# Patient Record
Sex: FEMALE | Race: WHITE
[De-identification: names, ages, dates, MRNs, and addresses within clinical notes are randomized per-mention and may not be internally consistent; named-entity substitution may affect disease eponyms.]

---

## 2019-09-22 ENCOUNTER — HOSPITAL ENCOUNTER (INPATIENT)
Dept: HOSPITAL 4 - SED | Age: 29
LOS: 3 days | Discharge: HOME HEALTH | DRG: 721 | End: 2019-09-25
Attending: INTERNAL MEDICINE | Admitting: INTERNAL MEDICINE
Payer: MEDICAID

## 2019-09-22 VITALS — WEIGHT: 170 LBS | BODY MASS INDEX: 32.1 KG/M2 | HEIGHT: 61 IN

## 2019-09-22 VITALS — SYSTOLIC BLOOD PRESSURE: 107 MMHG

## 2019-09-22 VITALS — SYSTOLIC BLOOD PRESSURE: 122 MMHG

## 2019-09-22 VITALS — SYSTOLIC BLOOD PRESSURE: 113 MMHG

## 2019-09-22 VITALS — SYSTOLIC BLOOD PRESSURE: 136 MMHG

## 2019-09-22 VITALS — SYSTOLIC BLOOD PRESSURE: 108 MMHG
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
## 2019-09-22 DIAGNOSIS — I10: ICD-10-CM

## 2019-09-22 DIAGNOSIS — L03.311: ICD-10-CM

## 2019-09-22 DIAGNOSIS — J45.909: ICD-10-CM

## 2019-09-22 DIAGNOSIS — N73.9: ICD-10-CM
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
## 2019-09-22 DIAGNOSIS — T81.41XA: Primary | ICD-10-CM

## 2019-09-22 LAB
ALBUMIN SERPL BCP-MCNC: 2.2 G/DL (ref 3.4–4.8)
ALT SERPL W P-5'-P-CCNC: 34 U/L (ref 12–78)
ANION GAP SERPL CALCULATED.3IONS-SCNC: 9 MMOL/L (ref 5–15)
APPEARANCE UR: (no result)
AST SERPL W P-5'-P-CCNC: 26 U/L (ref 10–37)
BACTERIA URNS QL MICRO: (no result) /HPF
BASOPHILS NFR BLD MANUAL: 0 % (ref 0–2)
BILIRUB SERPL-MCNC: 0.1 MG/DL (ref 0–1)
BILIRUB UR QL STRIP: NEGATIVE
BUN SERPL-MCNC: 8 MG/DL (ref 8–21)
CALCIUM SERPL-MCNC: 8.4 MG/DL (ref 8.4–11)
CHLORIDE SERPL-SCNC: 103 MMOL/L (ref 98–107)
COLOR UR: YELLOW
CREAT SERPL-MCNC: 0.59 MG/DL (ref 0.55–1.3)
EOSINOPHIL NFR BLD MANUAL: 0 % (ref 0–7)
ERYTHROCYTE [DISTWIDTH] IN BLOOD BY AUTOMATED COUNT: 13.5 % (ref 9–15)
GFR SERPL CREATININE-BSD FRML MDRD: 153 ML/MIN (ref 90–?)
GLUCOSE SERPL-MCNC: 99 MG/DL (ref 70–99)
GLUCOSE UR STRIP-MCNC: NEGATIVE MG/DL
HCT VFR BLD AUTO: 35.2 % (ref 36–48)
HGB BLD-MCNC: 11.7 G/DL (ref 12–16)
HGB UR QL STRIP: (no result)
KETONES UR STRIP-MCNC: NEGATIVE MG/DL
LEUKOCYTE ESTERASE UR QL STRIP: NEGATIVE
LYMPHOCYTES NFR BLD MANUAL: 11 % (ref 20–46)
MCH RBC QN AUTO: 31 PG (ref 27–31)
MCHC RBC AUTO-ENTMCNC: 33 % (ref 32–36)
MCV RBC AUTO: 94 FL (ref 79–98)
MONOCYTES # BLD MANUAL: 5 % (ref 0–11)
NEUTS BAND NFR BLD MANUAL: 2 % (ref 0–6)
NITRITE UR QL STRIP: NEGATIVE
PH UR STRIP: 6.5 [PH] (ref 5–8)
PLATELET # BLD AUTO: 301 K/UL (ref 130–430)
POTASSIUM SERPL-SCNC: 3.5 MMOL/L (ref 3.5–5.1)
PROT UR QL STRIP: NEGATIVE
RBC # BLD AUTO: 3.74 MIL/UL (ref 4.2–6.2)
RBC #/AREA URNS HPF: (no result) /HPF (ref 0–3)
SODIUM SERPLBLD-SCNC: 137 MMOL/L (ref 136–145)
SP GR UR STRIP: 1.01 (ref 1–1.03)
UROBILINOGEN UR STRIP-MCNC: 0.2 MG/DL (ref 0.2–1)
VARIANT LYMPHS NFR BLD MANUAL: 0 % (ref 0–0)
WBC # BLD AUTO: 12.8 K/UL (ref 4.8–10.8)
WBC #/AREA URNS HPF: (no result) /HPF (ref 0–3)

## 2019-09-22 RX ADMIN — ACETAMINOPHEN PRN MG: 325 TABLET ORAL at 23:16

## 2019-09-22 RX ADMIN — SODIUM CHLORIDE SCH MLS/HR: 0.9 INJECTION, SOLUTION INTRAVENOUS at 18:18

## 2019-09-22 RX ADMIN — DEXTROSE MONOHYDRATE SCH MLS/HR: 50 INJECTION, SOLUTION INTRAVENOUS at 20:16

## 2019-09-22 RX ADMIN — MORPHINE SULFATE PRN MG: 2 INJECTION, SOLUTION INTRAMUSCULAR; INTRAVENOUS at 15:44

## 2019-09-22 RX ADMIN — DEXTROSE MONOHYDRATE SCH MLS/HR: 50 INJECTION, SOLUTION INTRAVENOUS at 09:34

## 2019-09-22 RX ADMIN — MORPHINE SULFATE PRN MG: 2 INJECTION, SOLUTION INTRAMUSCULAR; INTRAVENOUS at 20:16

## 2019-09-22 RX ADMIN — DEXTROSE MONOHYDRATE SCH MLS/HR: 50 INJECTION, SOLUTION INTRAVENOUS at 15:22

## 2019-09-22 RX ADMIN — SODIUM CHLORIDE SCH MLS/HR: 0.9 INJECTION, SOLUTION INTRAVENOUS at 11:30

## 2019-09-22 NOTE — NUR
CONSULTATION PAGED/CALLED

Reason for Consultation: ABDOMINAL WALL CELLULITIS

Person Who was Notified: SHARAD

Consulting Physician: DR. INGRAM /DR. VASQUEZ ON CALL

Consultant Specialty: ID

Ordering Physician: DR. MCWILLIAMS

## 2019-09-22 NOTE — NUR
ADMISSION NOTE

Received patient from ER via jane, received report from CYNTHIA MALHOTRA. Patient admitted with 
diagnosis of CELLULITIS OF ABDOMINAL WALL. Patient oriented to hospital routine, call light, 
toileting and safety-patient verbalized understanding.

## 2019-09-22 NOTE — NUR
Pain:



Patient is complaining of severe abdominal pain. Educated patient regarding indications and 
side effects of Morphine, understanding verbalized. Morphine 2 MG administered intravenously 
as ordered. No adverse reactions noted. Call light with patient. Safety and fall precautions 
in place. Will continue to monitor.

## 2019-09-22 NOTE — NUR
Received report from MARYA Chu. Patient to be admitted or transferred. 
Patient in bed, resting, no s/s of acute distress noted. Will continue to 
monitor.

## 2019-09-22 NOTE — NUR
Pain:



Patient is complaining of abdominal pain 3/10 and requested Tylenol. Education provided 
regarding indications and side effects. Tylenol 650 MG PO administered. Call light is with 
patient. Safety, fall precautions in place. Will continue monitoring.

## 2019-09-22 NOTE — NUR
ROUND

Patient resting in the bed. No acute distress. Breast pumping machine at bedside. Family at 
bedside. Safety measure maintained. Call light within reached. Continue to monitor,

## 2019-09-22 NOTE — NUR
MORPHINE GIVEN

Patient c/o abdomen pain 7/10, Morphine 2 mg IVP given as ordered. No acute distress. IV 
intact, Zosyn infusing well. Safety measure maintained. Call light within reached. Bed 
locked in low position, side rails up. Family at bedside. Continue to monitor.

## 2019-09-22 NOTE — NUR
Patient will be admitted to care of Dr Guevara.  Admitted to med/surg unit.  Will 
go to room 124b.  Belongings list completed.  Summary report printed. Report 
will be given at bedside.

## 2019-09-22 NOTE — NUR
Initial note:



Received report from kameron RN. Patient is awake in bed, no distress noted. Alert and 
oriented x4. Even and unlabored breathing on room air. IV antibiotics infusing well to right 
AC IV site, no infiltration noted. Abdominal dressing change and wound care was performed by 
kameron RN, dressing is clean, dry, intact. Call light with patient. Safety and fall 
precautions in place. Will continue with plan of care.

## 2019-09-22 NOTE — NUR
patient in bed sleeping. nmo\\

-------------------------------------------------------------------------------

Addendum: 09/22/19 at 1016 by DANNIELLEKH

-------------------------------------------------------------------------------

patient in bed sleeping. no s/s of acute distress noted. will continue to 
monitor

## 2019-09-22 NOTE — NUR
Patient will be admitted to care of Dr. Philip.  Admitted to medsurg unit.  
Room to be assigned.  Belongings list completed.  Summary report printed. 
Report will be given at bedside.

## 2019-09-22 NOTE — NUR
CLOSING NOTE

Patient resting in the bed. No acute distress. No c/o pain at this time. Skin warm and dry 
to touch. IV intact to RAC, no redness, no swelling, no drainage, infusing Vancomycin at 
this time. Abdomen wound intact to clean and dry dressing, no active bleeding/ drainage 
noted at this time. All needs met. Safety measure maintained. Bed locked in low position, 
side rails up. Call light within reached. Will endorse to night nurse.

## 2019-09-22 NOTE — NUR
PATIENT DO NOT WANT TO PUMP MILK

OB nurseDawna at bedside and educated and explained to the patient what to do if do not 
want to pump the milk, verbally understanding. Pump machine returned to OB unit and abdomen 
binder provided to patient to apply on the breast. Per patient will do it herself later.

## 2019-09-22 NOTE — NUR
INITIAL ADMISSION NOTE

Patient resting in the bed. No acute distress. AAO x 4. Skin warm and dry to touch. SL 
intact to RAC, no redness, no swelling, patent. Discussed the safety issue, use call light 
when needs help, and plan of care, verbally understanding. Safety measure maintained. Call 
light within reached. Bed locked in low position, side rails up. Will continue to monitor.

## 2019-09-23 VITALS — SYSTOLIC BLOOD PRESSURE: 118 MMHG

## 2019-09-23 VITALS — SYSTOLIC BLOOD PRESSURE: 110 MMHG

## 2019-09-23 VITALS — SYSTOLIC BLOOD PRESSURE: 108 MMHG

## 2019-09-23 VITALS — SYSTOLIC BLOOD PRESSURE: 111 MMHG

## 2019-09-23 VITALS — SYSTOLIC BLOOD PRESSURE: 124 MMHG

## 2019-09-23 LAB
ALBUMIN SERPL BCP-MCNC: 2.1 G/DL (ref 3.4–4.8)
ALT SERPL W P-5'-P-CCNC: 30 U/L (ref 12–78)
ANION GAP SERPL CALCULATED.3IONS-SCNC: 5 MMOL/L (ref 5–15)
AST SERPL W P-5'-P-CCNC: 18 U/L (ref 10–37)
BASOPHILS # BLD AUTO: 0.1 K/UL (ref 0–0.2)
BASOPHILS NFR BLD AUTO: 0.7 % (ref 0–2)
BILIRUB SERPL-MCNC: 0.3 MG/DL (ref 0–1)
BUN SERPL-MCNC: 9 MG/DL (ref 8–21)
CALCIUM SERPL-MCNC: 8.6 MG/DL (ref 8.4–11)
CHLORIDE SERPL-SCNC: 108 MMOL/L (ref 98–107)
CREAT SERPL-MCNC: 0.7 MG/DL (ref 0.55–1.3)
EOSINOPHIL # BLD AUTO: 0.3 K/UL (ref 0–0.4)
EOSINOPHIL NFR BLD AUTO: 2.5 % (ref 0–4)
ERYTHROCYTE [DISTWIDTH] IN BLOOD BY AUTOMATED COUNT: 13.5 % (ref 9–15)
GFR SERPL CREATININE-BSD FRML MDRD: 126 ML/MIN (ref 90–?)
GLUCOSE SERPL-MCNC: 87 MG/DL (ref 70–99)
HCT VFR BLD AUTO: 36.6 % (ref 36–48)
HGB BLD-MCNC: 12 G/DL (ref 12–16)
LYMPHOCYTES # BLD AUTO: 2.5 K/UL (ref 1–5.5)
LYMPHOCYTES NFR BLD AUTO: 23.5 % (ref 20.5–51.5)
MCH RBC QN AUTO: 31 PG (ref 27–31)
MCHC RBC AUTO-ENTMCNC: 33 % (ref 32–36)
MCV RBC AUTO: 95 FL (ref 79–98)
MONOCYTES # BLD MANUAL: 0.6 K/UL (ref 0–1)
MONOCYTES # BLD MANUAL: 5.2 % (ref 1.7–9.3)
NEUTROPHILS # BLD AUTO: 7.2 K/UL (ref 1.8–7.7)
NEUTROPHILS NFR BLD AUTO: 68.1 % (ref 40–70)
PLATELET # BLD AUTO: 331 K/UL (ref 130–430)
POTASSIUM SERPL-SCNC: 4 MMOL/L (ref 3.5–5.1)
RBC # BLD AUTO: 3.86 MIL/UL (ref 4.2–6.2)
SODIUM SERPLBLD-SCNC: 141 MMOL/L (ref 136–145)
WBC # BLD AUTO: 10.6 K/UL (ref 4.8–10.8)

## 2019-09-23 RX ADMIN — CLINDAMYCIN PHOSPHATE SCH MLS/HR: 150 INJECTION, SOLUTION INTRAVENOUS at 13:33

## 2019-09-23 RX ADMIN — ACETAMINOPHEN PRN MG: 325 TABLET ORAL at 20:34

## 2019-09-23 RX ADMIN — SODIUM CHLORIDE SCH MLS/HR: 0.9 INJECTION, SOLUTION INTRAVENOUS at 01:57

## 2019-09-23 RX ADMIN — SODIUM CHLORIDE SCH MLS/HR: 0.9 INJECTION, SOLUTION INTRAVENOUS at 09:45

## 2019-09-23 RX ADMIN — MORPHINE SULFATE PRN MG: 2 INJECTION, SOLUTION INTRAMUSCULAR; INTRAVENOUS at 15:26

## 2019-09-23 RX ADMIN — MORPHINE SULFATE PRN MG: 2 INJECTION, SOLUTION INTRAMUSCULAR; INTRAVENOUS at 21:55

## 2019-09-23 RX ADMIN — ACETAMINOPHEN PRN MG: 325 TABLET ORAL at 08:59

## 2019-09-23 RX ADMIN — DEXTROSE MONOHYDRATE SCH MLS/HR: 50 INJECTION, SOLUTION INTRAVENOUS at 03:39

## 2019-09-23 RX ADMIN — SODIUM CHLORIDE SCH MLS/HR: 0.9 INJECTION, SOLUTION INTRAVENOUS at 18:02

## 2019-09-23 RX ADMIN — DEXTROSE MONOHYDRATE SCH MLS/HR: 50 INJECTION, SOLUTION INTRAVENOUS at 08:58

## 2019-09-23 RX ADMIN — CLINDAMYCIN PHOSPHATE SCH MLS/HR: 150 INJECTION, SOLUTION INTRAVENOUS at 21:47

## 2019-09-23 NOTE — NUR
CONSULTATION PAGED

REASON FOR CONSULTATION:ABD. WALL CELLULITIS

WAS CONSULT CALLED?Y

PERSON WHO WAS NOTIFIED:ELLA

CONSULTING PHYSICIAN:RODNEY RIVAS

CONSULTANT SPECIALTY:ID

CONSULTANT PHONE NUMBER:[phone]

REQUESTING PHYSICIAN:TOÑO STOUT

## 2019-09-23 NOTE — NUR
CLOSING NOTES,

PT HAS BEEN STABLE THE WHOLE SHIFT, NO FEVER, GIVEN PAIN MEDS WITH GOOD PAIN RELIEF, WOUND 
CARE DONE, PT TOLERATED WELL. ALL ORDERED IV ABX GIVEN.  PT IS AMBULATORY. SAFETY PRECAUTION 
KEPT IN PLACE. NO FALLS. WILL ENDORSE TO NIGHT NURSE.

## 2019-09-23 NOTE — NUR
IV INSERTION/MORPHINE GIVEN

Pt's old IV site was infiltrated, new IV inserted by MARYA Dumont on left hand G22. With good 
blood return and flushable with saline. Pt complained of abdominal pain with a scale of 
8/10. Morphine 2mg IVP given as ordered. Educated on safety and side effects like dizziness, 
pt verbalized understanding. Safety precautions in place and call light with pt. Will 
continue to monitor.

## 2019-09-23 NOTE — NUR
Closing note:



Patient is sleeping in bed, no distress noted. Tolerating room air. IV site is saline 
locked, patent and benign. Abdominal dressing is clean, dry, and intact. All needs met. 
Safety and fall precautions maintained. Hourly rounding performed throughout shift. Will 
endorse care to dayshift RN.

## 2019-09-23 NOTE — NUR
TYLENOL GIVEN

Pt complained of abdominal pain with a scale of 3/10. Tylenol 650mg PO given as ordered. No 
signs of acute distress noted. Will continue to monitor.

## 2019-09-23 NOTE — NUR
OPENING NOTES

Pt and endorsement received from day shift nurse. Pt is AAOx4, lying in bed. IVPB infusing 
well on right AC G18. No complains of pain or discomfort at this time. No signs of acute 
distress or SOB noted. Encouraged to use call light when needed. Safety precautions in place 
with 2 side rails up, wheels locked, and bed in lowest level. Call light with pt. Will 
continue to monitor.

## 2019-09-23 NOTE — NUR
Rounds:



Patient is asleep, no distress noted. Tolerating room air, even and unlabored breathing. IV 
antibiotics infusing well to right AC IV site. Call light is with patient. Will continue 
monitoring.

## 2019-09-23 NOTE — NUR
Rounds:



Patient is asleep, no acute distress noted. Even, unlabored breathing on room air. IV fluids 
infusing as ordered. Call light with patient. Safety and fall precautions in place. Will 
continue monitoring.

## 2019-09-23 NOTE — NUR
OPENING NOTES,

RECEIVED PT IN BED, PT IS AAOX4, DENIES PAIN, NO FEVER, BP WNL, HR IS 51 BUT SIGNS AND 
SYMPTOMS OF BRADYCARDIA, SALINE ON R. AC INTACT AND PATENT. WOUND DRESSING INTACT , NO 
DRAINAGE NOTED. SAFETY PRECAUTION KEPT IN PLACE. CALL LIGHT IN REACH. BED IN LOW POSITION. 
ENCOURAGED TO CALL FOR ASSIST AND FOR CONCERNS. WILL MONITOR.

## 2019-09-24 VITALS — SYSTOLIC BLOOD PRESSURE: 108 MMHG

## 2019-09-24 VITALS — SYSTOLIC BLOOD PRESSURE: 107 MMHG

## 2019-09-24 VITALS — SYSTOLIC BLOOD PRESSURE: 105 MMHG

## 2019-09-24 VITALS — SYSTOLIC BLOOD PRESSURE: 109 MMHG

## 2019-09-24 VITALS — SYSTOLIC BLOOD PRESSURE: 115 MMHG

## 2019-09-24 VITALS — SYSTOLIC BLOOD PRESSURE: 110 MMHG

## 2019-09-24 VITALS — SYSTOLIC BLOOD PRESSURE: 100 MMHG

## 2019-09-24 VITALS — SYSTOLIC BLOOD PRESSURE: 112 MMHG

## 2019-09-24 VITALS — SYSTOLIC BLOOD PRESSURE: 114 MMHG

## 2019-09-24 PROCEDURE — 0JBC0ZZ EXCISION OF PELVIC REGION SUBCUTANEOUS TISSUE AND FASCIA, OPEN APPROACH: ICD-10-PCS | Performed by: SURGERY

## 2019-09-24 RX ADMIN — SODIUM CHLORIDE SCH MLS/HR: 9 INJECTION, SOLUTION INTRAVENOUS at 10:29

## 2019-09-24 RX ADMIN — CLINDAMYCIN PHOSPHATE SCH MLS/HR: 150 INJECTION, SOLUTION INTRAVENOUS at 14:00

## 2019-09-24 RX ADMIN — CLINDAMYCIN PHOSPHATE SCH MLS/HR: 150 INJECTION, SOLUTION INTRAVENOUS at 05:01

## 2019-09-24 RX ADMIN — ACETAMINOPHEN PRN MG: 325 TABLET ORAL at 10:30

## 2019-09-24 RX ADMIN — DEXTROSE MONOHYDRATE SCH MLS/HR: 50 INJECTION, SOLUTION INTRAVENOUS at 22:11

## 2019-09-24 RX ADMIN — SODIUM CHLORIDE SCH MLS/HR: 9 INJECTION, SOLUTION INTRAVENOUS at 01:00

## 2019-09-24 NOTE — NUR
TO OR

Pt was wheeled to OR via gurney with OR nurse. Pt in stable condition. No complains of pain 
and no signs of acute distress noted. Pt's family assisted to waiting area.

## 2019-09-24 NOTE — NUR
ROUNDS

Pt is resting in bed with both eyes closed, with visible chest rise and fall with 
non-labored breathing noted. IVPB infusing well. No complains of pain and no signs of acute 
distress noted. Safety precautions in place and call light with pt. Will continue to 
monitor.

## 2019-09-24 NOTE — NUR
RN ROUNDS:

PATIENT AWAKE AND ALERT x4. FAMILY AT BEDSIDE. PATIENT DENIES ANY PAIN AT THE MOMENT. NO 
SIGNS OF DISTRESS OR SHORTNESS OF BREATH. PATIENT IN STABLE CONDITION. WILL CONTINUE TO 
MONITOR PATIENT FOR ANY CHANGES.

## 2019-09-24 NOTE — NUR
RN ROUNDS:

PATIENT AWAKE AND ALERT x4. FAMILY AT BEDSIDE. PATIENT DENIES ANY PAIN AT THE MOMENT. NO 
SIGNS OF DISTRESS OR SHORTNESS OF BREATH NOTED. PATIENT IN STABLE CONDITION. WILL CONTINUE 
TO MONITOR PATIENT FOR ANY CHANGES.

## 2019-09-24 NOTE — NUR
MD ROUNDS:

DR. PEREZ MAKING ROUNDS. AWARE OF PATIENT'S CONDITION. PATIENT WILL HAVE AN INCISION AND 
DRAINAGE OF THE ABDOMEN AT 1930 TONIGHT.

## 2019-09-24 NOTE — NUR
ROUNDS

Pt is resting in bed with both eyes closed, with visible chest rise and fall with 
non-labored breathing noted. Pt is easily arousable. No signs of acute distress noted. No 
needs at this time. Safety precautions in place and call light with pt. Will continue to 
monitor.

## 2019-09-24 NOTE — NUR
CONSULTATION:



REASON  FOR CONSULT: CELLULITIS



CONSULTING PHYSICIAN: CANDI PEREZ MD



ORDERED BY: TOÑO POLANCO MD



SPOKE WITH [facility]



[phone]

## 2019-09-24 NOTE — NUR
CLOSING NOTES:

PATIENT IS AWAKE AND ALERT x4 SITTING UP IN BED. PATIENT DENIES ANY PAIN AT THE MOMENT. NO 
SIGNS OF DISTRESS OR SHORTNESS OF BREATH NOTED. PATIENT IS TOLERATING OXYGEN AT ROOM AIR. IV 
SITE IS PATENT WITH NO SIGNS OF INFILTRATION. PATIENT IN STABLE CONDITION. SAFETY AND FALL 
PRECAUTIONS ARE IN PLACE. BED LOCKED IN LOWEST POSITION WITH CALL LIGHT IN REACH. WILL 
ENDORSE PATIENT CARE TO ONCOMING NIGHT SHIFT NURSE.

-------------------------------------------------------------------------------

Addendum: 09/24/19 at 1843 by Lexi Madera RN

-------------------------------------------------------------------------------

PATIENT HAD CHG BATH ALREADY AND AWAITING SURGERY THIS EVENING, PT NPO.

## 2019-09-24 NOTE — NUR
CLOSING NOTES

Pt is resting in bed with both eyes closed, with visible chest rise and fall with 
non-labored breathing noted. No complains of pain or discomfort at this time. No signs of 
acute distress or SOB noted. All needs attended throughout the shift. Safety precautions 
maintained with 2 side rails up, wheels locked and bed in lowest position. Call light with 
pt. Will endorse to day shift nurse.

## 2019-09-24 NOTE — NUR
FROM PACU

Pt came back from PACU via gurney with PACU nurse. Pt's abdominal dressing is clean, dry and 
intact.  No complains of pain at this time. No signs of acute distress noted. Vital signs 
taken and recorded. Will monitor vital signs every 15 mins in one hour and every 30 mins in 
the next hour. IVF infusing well. Family at bedside.

## 2019-09-24 NOTE — NUR
OPENING NOTES:

RECEIVED PATIENT FROM NIGHT SHIFT NURSE. PATIENT IS AWAKE AND ALERT x4. PATIENT DENIES ANY 
PAIN AT THE MOMENT. NO SIGNS OF DISTRESS OR SHORTNESS OF BREATH NOTED. PATIENT IS TOLERATING 
OXYGEN AT ROOM AIR. IV SITE IS PATENT WITH NO SIGNS OF INFILTRATION. PATIENT IN STABLE 
CONDITION. SAFETY AND FALL PRECAUTIONS ARE IN PLACE. BED LOCKED IN LOWEST POSITION WITH CALL 
LIGHT IN REACH. WILL CONTINUE TO MONITOR PATIENT FOR ANY CHANGES.

## 2019-09-24 NOTE — NUR
Wound Consult Attempted:





Dr. Pinto saw patient and assessed Abdominal wound, which was draining a lot and the 
dressing was applied quickly to slow down the drainage. The patient was scheduled for an 
Incision & Drainage today at 1930 with Dr. Pinto.



Will await orders by Dr. Pinto and assess the site post surgery.

## 2019-09-24 NOTE — NUR
PAIN MED

Pt complained of abdominal pain on incision site. Vital signs taken and recorded. Pain med 
given via IVP. Educated on safety and side effects like drowsiness, pt verbalized 
understanding. No signs of acute distress noted. Safety precautions in place and call light 
with pt. Will continue to monitor.

## 2019-09-24 NOTE — NUR
RN ROUNDS:

PATIENT IS AWAKE AND ALERT x4. PATIENT DENIES ANY PAIN AT THE MOMENT. NO SIGNS OF DISTRESS 
OR SHORTNESS OF BREATH. PATIENT IN STABLE CONDITION. WILL CONTINUE TO MONITOR PATIENT FOR 
ANY CHANGES.

## 2019-09-24 NOTE — NUR
RN ROUNDS:

PATIENT IS AWAKE AND ALERT x4. NO SIGNS OF DISTRESS OR SHORTNESS OF BREATH NOTED. PATIENT 
STATES SHE IS NO LONGER IN PAIN. PATIENT IN STABLE CONDITION. WILL CONTINUE TO MONITOR 
PATIENT FOR ANY CHANGES.

## 2019-09-25 VITALS — SYSTOLIC BLOOD PRESSURE: 117 MMHG

## 2019-09-25 VITALS — SYSTOLIC BLOOD PRESSURE: 105 MMHG

## 2019-09-25 VITALS — SYSTOLIC BLOOD PRESSURE: 107 MMHG

## 2019-09-25 VITALS — SYSTOLIC BLOOD PRESSURE: 113 MMHG

## 2019-09-25 RX ADMIN — DEXTROSE MONOHYDRATE SCH MLS/HR: 50 INJECTION, SOLUTION INTRAVENOUS at 14:18

## 2019-09-25 RX ADMIN — DEXTROSE MONOHYDRATE SCH MLS/HR: 50 INJECTION, SOLUTION INTRAVENOUS at 05:11

## 2019-09-25 RX ADMIN — ACETAMINOPHEN PRN MG: 325 TABLET ORAL at 08:19

## 2019-09-25 RX ADMIN — ACETAMINOPHEN PRN MG: 325 TABLET ORAL at 14:19

## 2019-09-25 RX ADMIN — MORPHINE SULFATE PRN MG: 2 INJECTION, SOLUTION INTRAMUSCULAR; INTRAVENOUS at 18:09

## 2019-09-25 NOTE — NUR
Pt was dc'd home via wheelchair with all her belongings in stable condition. Transition of 
care/discharge instructions, including Rx for Keflex and Medication side effects fact sheet 
were explained and given to pt who verbalized understanding. Saline lock in LH was removed 
with the Angiocath intact. Wrist ID band was removed and placed in the shredder. While RN 
was in the room,  Agency called pt and stated  RN will see pt at home tomorrow for wound 
care. Pt stated the caller informed her that the  RN will assist her with dressing changes 
for 3 days. Pt then stated her sister-in-law who is an LVN will assist her with dressing 
changes afterwards. 

-------------------------------------------------------------------------------

Addendum: 09/25/19 at 2311 by Marely Caballero RN

-------------------------------------------------------------------------------

Pt stated Surgeon gave her his business card and someone from his office will call her for 
f/u appointment.

## 2019-09-25 NOTE — NUR
AM ROUNDS:

BEDSIDE REPORT RECEIVED FROM RILEY.PATIENT LYING IN THE BED,AWAKE ,ALERT AND ORIENTED X4. 
C/O PAIN 1-3 LEVEL. WILL F/U PAIN MEDS DURING BREAKFAST PER PATIENT. CALL LIGHT WITH IN 
REACH. BED LOCKED IN LOW POSITION. NO DISTRESS.

## 2019-09-25 NOTE — NUR
CLOSING NOTES

Pt is resting in bed with both eyes closed, with visible chest rise and fall with 
non-labored breathing noted. No complains of pain or discomfort at this time. No signs of 
acute distress or SOB noted. All needs attended throughout the shift. Safety precautions 
maintained with 3 side rails up, wheels locked and bed in lowest position. Call light with 
pt. Will endorse to day shift nurse.

## 2019-09-25 NOTE — NUR
DRESSING CHANGE:

IV PAIN MEDS GIVEN 30MINS PRIOR TO DRESSING CHANGED.DR PEREZ CAME AND DID WET TO DRY 
DRESSING. CLEANSE WITH NS,PACKED WITH 4X4 X2 AND PUT DRY 4X4 X2 OVER IT AND COVERED WITH 
ABDOMINAL PAD.

## 2019-09-25 NOTE — NUR
ROUNDS

Pt is AAOx4, lying in bed. Vital signs taken and recorded. No complains of pain and no signs 
of acute distress noted. No needs at this time. Safety precautions in place and call light 
with pt. Will continue to monitor.

## 2019-09-25 NOTE — NUR
CLOSING NOTES:

ENDORSED TO NIGHT NURSE PATIENT IN STABLE CONDITION. LOWER ABDOMEN INCISION DRESSING CHANGED 
BY DR PEREZ.WET TO DRY DRESSING DONE. WILL BE FREEMAN BHAT.

## 2019-09-25 NOTE — NUR
DC planning: Rec'd order for home health for wet to dry dressing--faxed orders to [facility] at fax#[phone]-and requested them to call me--CLAUDIA MALHOTRA

## 2019-09-25 NOTE — NUR
Pt is fully awake, alert and oriented x4. Pt is being dc'd home tonight and she declined 
photo of abdominal wound. Dressing is dry and intact. No c/o pain or discomfort at this 
time.

## 2019-09-25 NOTE — NUR
Case mgt: S/W Pebbles at [facility] [phone]--She is arranging [facility] and 
is aware pt needs DAILY wet-to-dry gauze dressing changes with normal saline--newer order 
that includes supplies has been faxed to Pebbles at [facility] fax#[phone]--Pebbles is 
arranging the home health and our nurse Hollie made aware pt needs 2 days supplies of gauze 
dressings and normal saline to go home with her until supplies can be ordered by [employer]. 
Pt gave updated [id number] in her address--Pebbles was given this info also- 
RN

## 2019-09-25 NOTE — NUR
ROUNDS

Pt is resting in bed with both eyes closed, with visible chest rise and fall with 
non-labored breathing noted. Pt is easily arousable. No signs of acute distress noted. 
Safety precautions in place and call light with pt. Will continue to monitor.